# Patient Record
Sex: MALE | Race: AMERICAN INDIAN OR ALASKA NATIVE
[De-identification: names, ages, dates, MRNs, and addresses within clinical notes are randomized per-mention and may not be internally consistent; named-entity substitution may affect disease eponyms.]

---

## 2020-03-13 ENCOUNTER — HOSPITAL ENCOUNTER (OUTPATIENT)
Dept: HOSPITAL 5 - GIO | Age: 55
Discharge: HOME | End: 2020-03-13
Attending: INTERNAL MEDICINE
Payer: OTHER GOVERNMENT

## 2020-03-13 VITALS — SYSTOLIC BLOOD PRESSURE: 166 MMHG | DIASTOLIC BLOOD PRESSURE: 78 MMHG

## 2020-03-13 DIAGNOSIS — K21.0: ICD-10-CM

## 2020-03-13 DIAGNOSIS — K64.8: ICD-10-CM

## 2020-03-13 DIAGNOSIS — F32.9: ICD-10-CM

## 2020-03-13 DIAGNOSIS — E11.9: ICD-10-CM

## 2020-03-13 DIAGNOSIS — Z80.0: ICD-10-CM

## 2020-03-13 DIAGNOSIS — Z80.3: ICD-10-CM

## 2020-03-13 DIAGNOSIS — M19.90: ICD-10-CM

## 2020-03-13 DIAGNOSIS — G47.30: ICD-10-CM

## 2020-03-13 DIAGNOSIS — Z86.010: ICD-10-CM

## 2020-03-13 DIAGNOSIS — K62.1: ICD-10-CM

## 2020-03-13 DIAGNOSIS — K31.89: ICD-10-CM

## 2020-03-13 DIAGNOSIS — I10: ICD-10-CM

## 2020-03-13 DIAGNOSIS — Z12.11: Primary | ICD-10-CM

## 2020-03-13 DIAGNOSIS — K29.80: ICD-10-CM

## 2020-03-13 DIAGNOSIS — Z79.82: ICD-10-CM

## 2020-03-13 DIAGNOSIS — K29.70: ICD-10-CM

## 2020-03-13 DIAGNOSIS — Z79.899: ICD-10-CM

## 2020-03-13 DIAGNOSIS — K57.30: ICD-10-CM

## 2020-03-13 PROCEDURE — 88305 TISSUE EXAM BY PATHOLOGIST: CPT

## 2020-03-13 PROCEDURE — 82962 GLUCOSE BLOOD TEST: CPT

## 2020-03-13 PROCEDURE — 88342 IMHCHEM/IMCYTCHM 1ST ANTB: CPT

## 2020-03-13 PROCEDURE — 45380 COLONOSCOPY AND BIOPSY: CPT

## 2020-03-13 PROCEDURE — 43239 EGD BIOPSY SINGLE/MULTIPLE: CPT

## 2020-03-13 NOTE — OPERATIVE REPORT
PROCEDURE:  Colonoscopy.



INDICATIONS:  A 54-year-old -American gentleman with an underlying

history of chronic lymphocytic leukemia.  He does have a family history of

cancer.  The patient's niece has had history of breast cancer.  EGD done prior

to the colonoscopy, it showed mild-to-moderate erosive esophagitis, gastritis

and duodenitis.



PROCEDURE:  Colonoscopy was done after getting informed consent with MAC

anesthesia.  Initial rectal exam was unremarkable.  Instrument was passed

through the rectum onto the cecum, which was identified with ileocecal valve and

the appendiceal orifice.  Visualization was fair to good.  The cecum, ascending

colon and transverse colon showed normal mucosa.  There were a minor diverticula

noted in the left colon.  The rectum showed 3 small polyps, possibly

hyperplastic that were removed by cold biopsy.  With minimal bleeding in the

rectum, also showed minor internal hemorrhoid on the retroverted view.



ASSESSMENT:  Colon polyp screening.  Small rectal polyps, possibly hyperplastic.

 Minor diverticular disease involving the left colon and minor internal

hemorrhoid.  There was minimal bleeding associated with the procedure.  No

complications associated with the procedure.



PLAN:  To encourage the patient to take fiber supplements.  Resume home

medication, but to avoid aspirin and aspirin-related products for the next few

days and treat the patient with PPI, if the patient is not already on it.  The

patient will be asked to follow up in the office in 1-2 weeks' time.



The procedure was done in the GI lab with assistance of the GI lab team, which

included karolina Goode and with assistance of anesthesia.





DD: 03/13/2020 12:57

DT: 03/13/2020 13:28

JOB# [id number]  [id number]

ARJUN/GONZÁLEZ

## 2020-03-13 NOTE — ANESTHESIA CONSULTATION
Anesthesia Consult and Med Hx


Date of service: 03/13/20





- Airway


Anesthetic Teeth Evaluation: Good


ROM Head & Neck: Adequate


Mental/Hyoid Distance: Inadequate


Mallampati Class: Class III


Intubation Access Assessment: Possibly Difficult





- Pulmonary Exam


CTA: Yes





- Cardiac Exam


Cardiac Exam: RRR





- Pre-Operative Health Status


ASA Pre-Surgery Classification: ASA3


Proposed Anesthetic Plan: MAC





- Pulmonary


Hx Smoking: Yes (Quit 20 years ago)


Hx Sleep Apnea: Yes (CPAP; infrequent use)





- Cardiovascular System


Hx Hypertension: Yes





- Central Nervous System


Hx Back Pain: Yes (Chronic pain; hx of nerve stimulator)





- Endocrine


Hx Liver Disease: Yes (Hyperlipidemia)


Hx Non-Insulin Dependent Diabetes: Yes





- Other Systems


Hx Cancer: Yes (CHRONIC LYMPHOCYTIC LEUKEMIA)


Hx Obesity: Yes (BMI 39)

## 2020-03-13 NOTE — PROCEDURE NOTE
Date of procedure: 03/13/20


Pre-op diagnosis: GERD/ Colon Polyp Screening and F/H/O Cancer (mniece had 

Breast Cancer)


Procedure: 





EGD with biopsy and Colonoscopy with Biopsy


Anesthesia: MAC


Surgeon: FORREST DIAZ


Estimated blood loss: minimal


Pathology: list


Specimen disposition: to lab


Condition: stable


Disposition: same day (Treat with PPI and encourage fiber intake.  Resume home 

medication, but abodi aspirin and NSAID for 5 days.  follow up in 1 to 2 weeks 

([phone]).)

## 2020-03-13 NOTE — OPERATIVE REPORT
PROCEDURE:  Esophagogastroduodenoscopy with biopsy.



INDICATIONS:  A 54-year-old -American gentleman with an underlying

history of chronic lymphocytic leukemia who has been having GERD symptoms.  EGD

was done to assess for any significant upper GI pathology.



DESCRIPTION OF PROCEDURE:  Procedure was done after getting informed consent

with MAC anesthesia.  Instrument was passed through the hypopharynx into the

esophagus, which showed mild to moderate erosive esophagitis.  Biopsy was done

from the distal esophagus.  Stomach showed gastritis.  Biopsy was done from the

gastric antrum, gastric body and angular incisura to rule out for H. pylori and

atrophic gastritis.  The pylorus was patent.  The duodenum in the first and the

second portion appeared normal.  No additional biopsy was done.  There was

minimal bleeding from the biopsy sites and no complications associated with the

procedure.



ASSESSMENT:  Gastroesophageal reflux disease symptoms, mild to moderate erosive

esophagitis, gastritis, duodenitis.



PLAN:  To treat the patient with PPI, have the patient avoid aspirin and

aspirin-related products for the next few days and follow up in the office in

1-2 weeks' time.  A colonoscopy will also be done as part of colon polyp

screening.  The procedure was done in the GI lab with assistance of the GI lab

team, which included TERRANCE Ferrer; Constantin turcios and with assistance of anesthesia.





DD: 03/13/2020 12:54

DT: 03/13/2020 13:27

JOB# [id number]  [id number]

ARJUN/GONZÁLEZ